# Patient Record
Sex: FEMALE | Race: WHITE | NOT HISPANIC OR LATINO | Employment: OTHER | URBAN - METROPOLITAN AREA
[De-identification: names, ages, dates, MRNs, and addresses within clinical notes are randomized per-mention and may not be internally consistent; named-entity substitution may affect disease eponyms.]

---

## 2023-05-09 RX ORDER — KETOCONAZOLE 20 MG/G
CREAM TOPICAL 2 TIMES DAILY PRN
COMMUNITY

## 2023-05-09 RX ORDER — ACETAMINOPHEN 500 MG
1000 TABLET ORAL EVERY 4 HOURS PRN
COMMUNITY

## 2023-05-09 RX ORDER — ATORVASTATIN CALCIUM 20 MG/1
20 TABLET, FILM COATED ORAL EVERY EVENING
COMMUNITY

## 2023-05-09 RX ORDER — AMOXICILLIN 250 MG
1 CAPSULE ORAL DAILY PRN
COMMUNITY

## 2023-05-09 RX ORDER — HYDROCODONE BITARTRATE AND ACETAMINOPHEN 5; 325 MG/1; MG/1
1 TABLET ORAL EVERY 6 HOURS PRN
COMMUNITY

## 2023-05-09 RX ORDER — ESTRADIOL 0.1 MG/G
CREAM VAGINAL
COMMUNITY

## 2023-05-09 RX ORDER — ONDANSETRON 4 MG/1
4 TABLET, ORALLY DISINTEGRATING ORAL EVERY 8 HOURS PRN
COMMUNITY

## 2023-05-09 RX ORDER — LOSARTAN POTASSIUM 50 MG/1
50 TABLET ORAL EVERY EVENING
COMMUNITY

## 2023-05-09 RX ORDER — CALCIUM CARBONATE-CHOLECALCIFEROL TAB 250 MG-125 UNIT 250-125 MG-UNIT
TAB ORAL
Status: ON HOLD | COMMUNITY
End: 2023-05-11

## 2023-05-09 RX ORDER — BUTALBITAL/ASPIRIN/CAFFEINE 50-325-40
1 CAPSULE ORAL EVERY 4 HOURS PRN
COMMUNITY

## 2023-05-09 RX ORDER — FAMOTIDINE 20 MG/1
20 TABLET, FILM COATED ORAL EVERY EVENING
COMMUNITY

## 2023-05-09 RX ORDER — OXYBUTYNIN CHLORIDE 5 MG/1
5 TABLET ORAL PRN
Status: ON HOLD | COMMUNITY
End: 2023-05-11

## 2023-05-09 RX ORDER — FLUTICASONE PROPIONATE 50 MCG
2 SPRAY, SUSPENSION (ML) NASAL DAILY PRN
COMMUNITY

## 2023-05-09 RX ORDER — LEVOTHYROXINE SODIUM 112 UG/1
112 TABLET ORAL DAILY
COMMUNITY

## 2023-05-10 ENCOUNTER — HOSPITAL ENCOUNTER (INPATIENT)
Facility: HOSPITAL | Age: 68
LOS: 1 days | Discharge: HOME | DRG: 655 | End: 2023-05-11
Attending: OBSTETRICS & GYNECOLOGY | Admitting: OBSTETRICS & GYNECOLOGY
Payer: MEDICARE

## 2023-05-10 ENCOUNTER — ANESTHESIA EVENT (OUTPATIENT)
Dept: SURGERY | Facility: HOSPITAL | Age: 68
DRG: 655 | End: 2023-05-10
Payer: MEDICARE

## 2023-05-10 ENCOUNTER — ANESTHESIA (OUTPATIENT)
Dept: SURGERY | Facility: HOSPITAL | Age: 68
DRG: 655 | End: 2023-05-10
Payer: MEDICARE

## 2023-05-10 DIAGNOSIS — N81.4 UTEROVAGINAL PROLAPSE: Primary | ICD-10-CM

## 2023-05-10 LAB
ABO/RH(D): NORMAL
ANTIBODY SCREEN: NEGATIVE
BASOPHILS # BLD AUTO: 0.1 10E3/UL (ref 0–0.2)
BASOPHILS NFR BLD AUTO: 1 %
EOSINOPHIL # BLD AUTO: 0.2 10E3/UL (ref 0–0.7)
EOSINOPHIL NFR BLD AUTO: 3 %
ERYTHROCYTE [DISTWIDTH] IN BLOOD BY AUTOMATED COUNT: 14.4 % (ref 10–15)
HCT VFR BLD AUTO: 34.7 % (ref 35–47)
HGB BLD-MCNC: 11.1 G/DL (ref 11.7–15.7)
HOLD SPECIMEN: NORMAL
HOLD SPECIMEN: NORMAL
IMM GRANULOCYTES # BLD: 0 10E3/UL
IMM GRANULOCYTES NFR BLD: 0 %
LYMPHOCYTES # BLD AUTO: 2.2 10E3/UL (ref 0.8–5.3)
LYMPHOCYTES NFR BLD AUTO: 26 %
MCH RBC QN AUTO: 26.9 PG (ref 26.5–33)
MCHC RBC AUTO-ENTMCNC: 32 G/DL (ref 31.5–36.5)
MCV RBC AUTO: 84 FL (ref 78–100)
MONOCYTES # BLD AUTO: 0.5 10E3/UL (ref 0–1.3)
MONOCYTES NFR BLD AUTO: 6 %
NEUTROPHILS # BLD AUTO: 5.3 10E3/UL (ref 1.6–8.3)
NEUTROPHILS NFR BLD AUTO: 64 %
NRBC # BLD AUTO: 0 10E3/UL
NRBC BLD AUTO-RTO: 0 /100
PLATELET # BLD AUTO: 311 10E3/UL (ref 150–450)
RBC # BLD AUTO: 4.12 10E6/UL (ref 3.8–5.2)
SPECIMEN EXPIRATION DATE: NORMAL
WBC # BLD AUTO: 8.3 10E3/UL (ref 4–11)

## 2023-05-10 PROCEDURE — 86901 BLOOD TYPING SEROLOGIC RH(D): CPT | Performed by: PHYSICIAN ASSISTANT

## 2023-05-10 PROCEDURE — 86850 RBC ANTIBODY SCREEN: CPT | Performed by: PHYSICIAN ASSISTANT

## 2023-05-10 PROCEDURE — 0USG4ZZ REPOSITION VAGINA, PERCUTANEOUS ENDOSCOPIC APPROACH: ICD-10-PCS | Performed by: OBSTETRICS & GYNECOLOGY

## 2023-05-10 PROCEDURE — 999N000141 HC STATISTIC PRE-PROCEDURE NURSING ASSESSMENT: Performed by: OBSTETRICS & GYNECOLOGY

## 2023-05-10 PROCEDURE — 360N000080 HC SURGERY LEVEL 7, PER MIN: Performed by: OBSTETRICS & GYNECOLOGY

## 2023-05-10 PROCEDURE — 250N000011 HC RX IP 250 OP 636

## 2023-05-10 PROCEDURE — 258N000003 HC RX IP 258 OP 636: Performed by: NURSE ANESTHETIST, CERTIFIED REGISTERED

## 2023-05-10 PROCEDURE — 0TJB8ZZ INSPECTION OF BLADDER, VIA NATURAL OR ARTIFICIAL OPENING ENDOSCOPIC: ICD-10-PCS | Performed by: OBSTETRICS & GYNECOLOGY

## 2023-05-10 PROCEDURE — 250N000011 HC RX IP 250 OP 636: Performed by: OBSTETRICS & GYNECOLOGY

## 2023-05-10 PROCEDURE — 250N000013 HC RX MED GY IP 250 OP 250 PS 637: Performed by: PHYSICIAN ASSISTANT

## 2023-05-10 PROCEDURE — 250N000011 HC RX IP 250 OP 636: Performed by: PHYSICIAN ASSISTANT

## 2023-05-10 PROCEDURE — 0UT24ZZ RESECTION OF BILATERAL OVARIES, PERCUTANEOUS ENDOSCOPIC APPROACH: ICD-10-PCS | Performed by: OBSTETRICS & GYNECOLOGY

## 2023-05-10 PROCEDURE — 88305 TISSUE EXAM BY PATHOLOGIST: CPT | Mod: 26 | Performed by: PATHOLOGY

## 2023-05-10 PROCEDURE — C1781 MESH (IMPLANTABLE): HCPCS | Performed by: OBSTETRICS & GYNECOLOGY

## 2023-05-10 PROCEDURE — 250N000011 HC RX IP 250 OP 636: Performed by: NURSE ANESTHETIST, CERTIFIED REGISTERED

## 2023-05-10 PROCEDURE — 250N000011 HC RX IP 250 OP 636: Performed by: ANESTHESIOLOGY

## 2023-05-10 PROCEDURE — 370N000017 HC ANESTHESIA TECHNICAL FEE, PER MIN: Performed by: OBSTETRICS & GYNECOLOGY

## 2023-05-10 PROCEDURE — 710N000009 HC RECOVERY PHASE 1, LEVEL 1, PER MIN: Performed by: OBSTETRICS & GYNECOLOGY

## 2023-05-10 PROCEDURE — 710N000012 HC RECOVERY PHASE 2, PER MINUTE: Performed by: OBSTETRICS & GYNECOLOGY

## 2023-05-10 PROCEDURE — 250N000009 HC RX 250

## 2023-05-10 PROCEDURE — 88307 TISSUE EXAM BY PATHOLOGIST: CPT | Mod: TC | Performed by: OBSTETRICS & GYNECOLOGY

## 2023-05-10 PROCEDURE — 85025 COMPLETE CBC W/AUTO DIFF WBC: CPT | Performed by: PHYSICIAN ASSISTANT

## 2023-05-10 PROCEDURE — 250N000025 HC SEVOFLURANE, PER MIN: Performed by: OBSTETRICS & GYNECOLOGY

## 2023-05-10 PROCEDURE — 0UT74ZZ RESECTION OF BILATERAL FALLOPIAN TUBES, PERCUTANEOUS ENDOSCOPIC APPROACH: ICD-10-PCS | Performed by: OBSTETRICS & GYNECOLOGY

## 2023-05-10 PROCEDURE — 250N000013 HC RX MED GY IP 250 OP 250 PS 637: Performed by: ANESTHESIOLOGY

## 2023-05-10 PROCEDURE — 0JUC3JZ SUPPLEMENT OF PELVIC REGION SUBCUTANEOUS TISSUE AND FASCIA WITH SYNTHETIC SUBSTITUTE, PERCUTANEOUS APPROACH: ICD-10-PCS | Performed by: OBSTETRICS & GYNECOLOGY

## 2023-05-10 PROCEDURE — 272N000001 HC OR GENERAL SUPPLY STERILE: Performed by: OBSTETRICS & GYNECOLOGY

## 2023-05-10 PROCEDURE — 36415 COLL VENOUS BLD VENIPUNCTURE: CPT | Performed by: PHYSICIAN ASSISTANT

## 2023-05-10 PROCEDURE — 120N000001 HC R&B MED SURG/OB

## 2023-05-10 PROCEDURE — 250N000009 HC RX 250: Performed by: NURSE ANESTHETIST, CERTIFIED REGISTERED

## 2023-05-10 PROCEDURE — 8E0W4CZ ROBOTIC ASSISTED PROCEDURE OF TRUNK REGION, PERCUTANEOUS ENDOSCOPIC APPROACH: ICD-10-PCS | Performed by: OBSTETRICS & GYNECOLOGY

## 2023-05-10 PROCEDURE — 0UQG4ZZ REPAIR VAGINA, PERCUTANEOUS ENDOSCOPIC APPROACH: ICD-10-PCS | Performed by: OBSTETRICS & GYNECOLOGY

## 2023-05-10 PROCEDURE — 258N000003 HC RX IP 258 OP 636: Performed by: ANESTHESIOLOGY

## 2023-05-10 PROCEDURE — 0UT94ZL RESECTION OF UTERUS, SUPRACERVICAL, PERCUTANEOUS ENDOSCOPIC APPROACH: ICD-10-PCS | Performed by: OBSTETRICS & GYNECOLOGY

## 2023-05-10 PROCEDURE — 250N000013 HC RX MED GY IP 250 OP 250 PS 637: Performed by: OBSTETRICS & GYNECOLOGY

## 2023-05-10 DEVICE — SLING Y MESH RESTORELLE CONTOUR 3X24CM 501520: Type: IMPLANTABLE DEVICE | Site: SACRUM | Status: FUNCTIONAL

## 2023-05-10 RX ORDER — FENTANYL CITRATE 50 UG/ML
50 INJECTION, SOLUTION INTRAMUSCULAR; INTRAVENOUS EVERY 5 MIN PRN
Status: DISCONTINUED | OUTPATIENT
Start: 2023-05-10 | End: 2023-05-10 | Stop reason: HOSPADM

## 2023-05-10 RX ORDER — ONDANSETRON 4 MG/1
4 TABLET, ORALLY DISINTEGRATING ORAL EVERY 30 MIN PRN
Status: DISCONTINUED | OUTPATIENT
Start: 2023-05-10 | End: 2023-05-10 | Stop reason: HOSPADM

## 2023-05-10 RX ORDER — PROPOFOL 10 MG/ML
INJECTION, EMULSION INTRAVENOUS CONTINUOUS PRN
Status: DISCONTINUED | OUTPATIENT
Start: 2023-05-10 | End: 2023-05-10

## 2023-05-10 RX ORDER — DEXAMETHASONE SODIUM PHOSPHATE 10 MG/ML
INJECTION, SOLUTION INTRAMUSCULAR; INTRAVENOUS PRN
Status: DISCONTINUED | OUTPATIENT
Start: 2023-05-10 | End: 2023-05-10

## 2023-05-10 RX ORDER — ACETAMINOPHEN 325 MG/1
975 TABLET ORAL ONCE
Status: COMPLETED | OUTPATIENT
Start: 2023-05-10 | End: 2023-05-10

## 2023-05-10 RX ORDER — FENTANYL CITRATE 50 UG/ML
INJECTION, SOLUTION INTRAMUSCULAR; INTRAVENOUS PRN
Status: DISCONTINUED | OUTPATIENT
Start: 2023-05-10 | End: 2023-05-10

## 2023-05-10 RX ORDER — LIDOCAINE HYDROCHLORIDE 10 MG/ML
INJECTION, SOLUTION INFILTRATION; PERINEURAL PRN
Status: DISCONTINUED | OUTPATIENT
Start: 2023-05-10 | End: 2023-05-10

## 2023-05-10 RX ORDER — CEFAZOLIN SODIUM/WATER 2 G/20 ML
2 SYRINGE (ML) INTRAVENOUS
Status: COMPLETED | OUTPATIENT
Start: 2023-05-10 | End: 2023-05-10

## 2023-05-10 RX ORDER — OXYCODONE HYDROCHLORIDE 5 MG/1
5 TABLET ORAL
Status: DISCONTINUED | OUTPATIENT
Start: 2023-05-10 | End: 2023-05-11 | Stop reason: HOSPADM

## 2023-05-10 RX ORDER — IBUPROFEN 600 MG/1
600 TABLET, FILM COATED ORAL EVERY 6 HOURS PRN
Status: DISCONTINUED | OUTPATIENT
Start: 2023-05-10 | End: 2023-05-11 | Stop reason: HOSPADM

## 2023-05-10 RX ORDER — SODIUM CHLORIDE, SODIUM LACTATE, POTASSIUM CHLORIDE, CALCIUM CHLORIDE 600; 310; 30; 20 MG/100ML; MG/100ML; MG/100ML; MG/100ML
INJECTION, SOLUTION INTRAVENOUS CONTINUOUS
Status: DISCONTINUED | OUTPATIENT
Start: 2023-05-10 | End: 2023-05-10 | Stop reason: HOSPADM

## 2023-05-10 RX ORDER — CEFAZOLIN SODIUM/WATER 2 G/20 ML
2 SYRINGE (ML) INTRAVENOUS SEE ADMIN INSTRUCTIONS
Status: DISCONTINUED | OUTPATIENT
Start: 2023-05-10 | End: 2023-05-10 | Stop reason: HOSPADM

## 2023-05-10 RX ORDER — KETAMINE HYDROCHLORIDE 10 MG/ML
INJECTION INTRAMUSCULAR; INTRAVENOUS PRN
Status: DISCONTINUED | OUTPATIENT
Start: 2023-05-10 | End: 2023-05-10

## 2023-05-10 RX ORDER — ONDANSETRON 2 MG/ML
4 INJECTION INTRAMUSCULAR; INTRAVENOUS EVERY 30 MIN PRN
Status: DISCONTINUED | OUTPATIENT
Start: 2023-05-10 | End: 2023-05-10 | Stop reason: HOSPADM

## 2023-05-10 RX ORDER — PROPOFOL 10 MG/ML
INJECTION, EMULSION INTRAVENOUS PRN
Status: DISCONTINUED | OUTPATIENT
Start: 2023-05-10 | End: 2023-05-10

## 2023-05-10 RX ORDER — LIDOCAINE 40 MG/G
CREAM TOPICAL
Status: DISCONTINUED | OUTPATIENT
Start: 2023-05-10 | End: 2023-05-10 | Stop reason: HOSPADM

## 2023-05-10 RX ORDER — HYDROMORPHONE HYDROCHLORIDE 1 MG/ML
0.4 INJECTION, SOLUTION INTRAMUSCULAR; INTRAVENOUS; SUBCUTANEOUS EVERY 5 MIN PRN
Status: DISCONTINUED | OUTPATIENT
Start: 2023-05-10 | End: 2023-05-10 | Stop reason: HOSPADM

## 2023-05-10 RX ORDER — ONDANSETRON 2 MG/ML
INJECTION INTRAMUSCULAR; INTRAVENOUS PRN
Status: DISCONTINUED | OUTPATIENT
Start: 2023-05-10 | End: 2023-05-10

## 2023-05-10 RX ORDER — OXYCODONE HYDROCHLORIDE 5 MG/1
10 TABLET ORAL
Status: COMPLETED | OUTPATIENT
Start: 2023-05-10 | End: 2023-05-10

## 2023-05-10 RX ORDER — GLYCOPYRROLATE 0.2 MG/ML
INJECTION, SOLUTION INTRAMUSCULAR; INTRAVENOUS PRN
Status: DISCONTINUED | OUTPATIENT
Start: 2023-05-10 | End: 2023-05-10

## 2023-05-10 RX ORDER — FENTANYL CITRATE 50 UG/ML
25 INJECTION, SOLUTION INTRAMUSCULAR; INTRAVENOUS EVERY 5 MIN PRN
Status: DISCONTINUED | OUTPATIENT
Start: 2023-05-10 | End: 2023-05-10 | Stop reason: HOSPADM

## 2023-05-10 RX ORDER — IBUPROFEN 600 MG/1
600 TABLET, FILM COATED ORAL ONCE
Status: COMPLETED | OUTPATIENT
Start: 2023-05-10 | End: 2023-05-10

## 2023-05-10 RX ORDER — BUPIVACAINE HYDROCHLORIDE 2.5 MG/ML
INJECTION, SOLUTION INFILTRATION; PERINEURAL PRN
Status: DISCONTINUED | OUTPATIENT
Start: 2023-05-10 | End: 2023-05-10 | Stop reason: HOSPADM

## 2023-05-10 RX ORDER — HYDROMORPHONE HYDROCHLORIDE 1 MG/ML
0.2 INJECTION, SOLUTION INTRAMUSCULAR; INTRAVENOUS; SUBCUTANEOUS EVERY 5 MIN PRN
Status: DISCONTINUED | OUTPATIENT
Start: 2023-05-10 | End: 2023-05-10 | Stop reason: HOSPADM

## 2023-05-10 RX ORDER — OXYCODONE HYDROCHLORIDE 5 MG/1
5-10 TABLET ORAL EVERY 4 HOURS PRN
Qty: 12 TABLET | Refills: 0 | Status: SHIPPED | OUTPATIENT
Start: 2023-05-10

## 2023-05-10 RX ORDER — MAGNESIUM SULFATE 4 G/50ML
4 INJECTION INTRAVENOUS ONCE
Status: COMPLETED | OUTPATIENT
Start: 2023-05-10 | End: 2023-05-10

## 2023-05-10 RX ADMIN — ACETAMINOPHEN 975 MG: 325 TABLET ORAL at 12:40

## 2023-05-10 RX ADMIN — PHENYLEPHRINE HYDROCHLORIDE 100 MCG: 10 INJECTION INTRAVENOUS at 13:37

## 2023-05-10 RX ADMIN — ACETAMINOPHEN 975 MG: 325 TABLET ORAL at 23:12

## 2023-05-10 RX ADMIN — IBUPROFEN 600 MG: 600 TABLET, FILM COATED ORAL at 23:12

## 2023-05-10 RX ADMIN — DEXAMETHASONE SODIUM PHOSPHATE 10 MG: 10 INJECTION, SOLUTION INTRAMUSCULAR; INTRAVENOUS at 13:10

## 2023-05-10 RX ADMIN — ROCURONIUM BROMIDE 50 MG: 50 INJECTION, SOLUTION INTRAVENOUS at 13:11

## 2023-05-10 RX ADMIN — LIDOCAINE HYDROCHLORIDE 50 MG: 10 INJECTION, SOLUTION INFILTRATION; PERINEURAL at 13:10

## 2023-05-10 RX ADMIN — KETAMINE HYDROCHLORIDE 40 MG: 10 INJECTION, SOLUTION INTRAMUSCULAR; INTRAVENOUS at 13:10

## 2023-05-10 RX ADMIN — MAGNESIUM SULFATE HEPTAHYDRATE 4 G: 80 INJECTION, SOLUTION INTRAVENOUS at 12:39

## 2023-05-10 RX ADMIN — FENTANYL CITRATE 50 MCG: 50 INJECTION, SOLUTION INTRAMUSCULAR; INTRAVENOUS at 16:18

## 2023-05-10 RX ADMIN — SODIUM CHLORIDE, POTASSIUM CHLORIDE, SODIUM LACTATE AND CALCIUM CHLORIDE: 600; 310; 30; 20 INJECTION, SOLUTION INTRAVENOUS at 12:39

## 2023-05-10 RX ADMIN — SUGAMMADEX 200 MG: 100 INJECTION, SOLUTION INTRAVENOUS at 14:54

## 2023-05-10 RX ADMIN — SODIUM CHLORIDE, POTASSIUM CHLORIDE, SODIUM LACTATE AND CALCIUM CHLORIDE: 600; 310; 30; 20 INJECTION, SOLUTION INTRAVENOUS at 15:30

## 2023-05-10 RX ADMIN — ONDANSETRON 4 MG: 2 INJECTION INTRAMUSCULAR; INTRAVENOUS at 14:49

## 2023-05-10 RX ADMIN — FENTANYL CITRATE 25 MCG: 50 INJECTION, SOLUTION INTRAMUSCULAR; INTRAVENOUS at 15:28

## 2023-05-10 RX ADMIN — PHENYLEPHRINE HYDROCHLORIDE 100 MCG: 10 INJECTION INTRAVENOUS at 13:34

## 2023-05-10 RX ADMIN — MIDAZOLAM 1 MG: 1 INJECTION INTRAMUSCULAR; INTRAVENOUS at 13:02

## 2023-05-10 RX ADMIN — PROPOFOL 150 MG: 10 INJECTION, EMULSION INTRAVENOUS at 13:10

## 2023-05-10 RX ADMIN — FENTANYL CITRATE 25 MCG: 50 INJECTION, SOLUTION INTRAMUSCULAR; INTRAVENOUS at 15:35

## 2023-05-10 RX ADMIN — PROPOFOL 30 MCG/KG/MIN: 10 INJECTION, EMULSION INTRAVENOUS at 13:18

## 2023-05-10 RX ADMIN — IBUPROFEN 600 MG: 200 TABLET, FILM COATED ORAL at 18:17

## 2023-05-10 RX ADMIN — FENTANYL CITRATE 100 MCG: 50 INJECTION, SOLUTION INTRAMUSCULAR; INTRAVENOUS at 13:10

## 2023-05-10 RX ADMIN — ROCURONIUM BROMIDE 10 MG: 50 INJECTION, SOLUTION INTRAVENOUS at 14:20

## 2023-05-10 RX ADMIN — GLYCOPYRROLATE 0.4 MG: 0.2 INJECTION INTRAMUSCULAR; INTRAVENOUS at 13:10

## 2023-05-10 RX ADMIN — OXYCODONE HYDROCHLORIDE 10 MG: 5 TABLET ORAL at 17:15

## 2023-05-10 RX ADMIN — Medication 2 G: at 13:25

## 2023-05-10 RX ADMIN — PHENYLEPHRINE HYDROCHLORIDE 100 MCG: 10 INJECTION INTRAVENOUS at 13:10

## 2023-05-10 ASSESSMENT — ACTIVITIES OF DAILY LIVING (ADL)
ADLS_ACUITY_SCORE: 20

## 2023-05-10 NOTE — ANESTHESIA PREPROCEDURE EVALUATION
Anesthesia Pre-Procedure Evaluation    Patient: Mary Ball   MRN: [id number] : 1955        Procedure : Procedure(s):  ROBOTIC SUPRACERVICAL HYSTERECTOMY WITH BILATERAL SALPINGO OOPHORECTOMY, SACROCOLPOPEXY, PARAVAGINAL REPAIR AND POSTERIOR REPAIR          Past Medical History:   Diagnosis Date     Anemia      Aortic valve regurgitation      Bladder prolapse, female, acquired      Gastroesophageal reflux disease      Hydronephrosis      Iron deficiency      Migraine      PONV (postoperative nausea and vomiting)      Thyroid disease       Past Surgical History:   Procedure Laterality Date     ORTHOPEDIC SURGERY        Allergies   Allergen Reactions     Adhesive Tape Rash     Steri strips     Ampicillin Rash     Morphine Rash      Social History     Tobacco Use     Smoking status: Never     Smokeless tobacco: Never   Vaping Use     Vaping status: Not on file   Substance Use Topics     Alcohol use: Yes     Comment: 1 glass of wine per month      Wt Readings from Last 1 Encounters:   05/10/23 74.4 kg (164 lb)        Anesthesia Evaluation   Pt has had prior anesthetic. Type: General.    History of anesthetic complications  - PONV.      ROS/MED HX  ENT/Pulmonary:  - neg pulmonary ROS     Neurologic:  - neg neurologic ROS     Cardiovascular:  - neg cardiovascular ROS     METS/Exercise Tolerance: >4 METS    Hematologic:  - neg hematologic  ROS     Musculoskeletal:  - neg musculoskeletal ROS     GI/Hepatic:     (+) GERD, Asymptomatic on medication,     Renal/Genitourinary:     (+) renal disease,     Endo:     (+) thyroid problem, hypothyroidism,     Psychiatric/Substance Use:  - neg psychiatric ROS     Infectious Disease:  - neg infectious disease ROS     Malignancy:  - neg malignancy ROS     Other:  - neg other ROS          Physical Exam    Airway  airway exam normal      Mallampati: II   TM distance: > 3 FB   Neck ROM: full   Mouth opening: > 3 cm    Respiratory Devices and Support         Dental            Cardiovascular   cardiovascular exam normal          Pulmonary   pulmonary exam normal                OUTSIDE LABS:  CBC:   Lab Results   Component Value Date    WBC 8.3 05/10/2023    HGB 11.1 (L) 05/10/2023    HCT 34.7 (L) 05/10/2023     05/10/2023     BMP: No results found for: NA, POTASSIUM, CHLORIDE, CO2, BUN, CR, GLC  COAGS: No results found for: PTT, INR, FIBR  POC: No results found for: BGM, HCG, HCGS  HEPATIC: No results found for: ALBUMIN, PROTTOTAL, ALT, AST, GGT, ALKPHOS, BILITOTAL, BILIDIRECT, DEE  OTHER: No results found for: PH, LACT, A1C, JANE, PHOS, MAG, LIPASE, AMYLASE, TSH, T4, T3, CRP, SED    Anesthesia Plan    ASA Status:  2   NPO Status:  NPO Appropriate    Anesthesia Type: General.     - Airway: ETT   Induction: Intravenous, Propofol.   Maintenance: Balanced.        Consents    Anesthesia Plan(s) and associated risks, benefits, and realistic alternatives discussed. Questions answered and patient/representative(s) expressed understanding.    - Discussed:     - Discussed with:  Patient      - Extended Intubation/Ventilatory Support Discussed: No.      - Patient is DNR/DNI Status: No    Use of blood products discussed: No .     Postoperative Care    Pain management: IV analgesics, Multi-modal analgesia.   PONV prophylaxis: Ondansetron (or other 5HT-3), Dexamethasone or Solumedrol, Droperidol or Haldol, Background Propofol Infusion     Comments:    Other Comments: 4 grams magnesium IV.  40 mg ketamine IV on induction.\            Ernesto Shepherd MD

## 2023-05-10 NOTE — ANESTHESIA PROCEDURE NOTES
Airway       Patient location during procedure: OR       Procedure Start/Stop Times: 5/10/2023 1:20 PM  Staff -        Anesthesiologist:  Ernesto Shepherd MD       CRNA: Veena Ma APRN CRNA       Other Anesthesia Staff: Brandt Jonas       Performed By: MEL  Consent for Airway        Urgency: elective  Indications and Patient Condition       Indications for airway management: scott-procedural and airway protection       Induction type:intravenous       Mask difficulty assessment: 1 - vent by mask    Final Airway Details       Final airway type: endotracheal airway       Successful airway: ETT - single  Endotracheal Airway Details        ETT size (mm): 7.0       Cuffed: yes       Successful intubation technique: video laryngoscopy       VL Blade Size: Glidescope 3       Grade View of Cords: 1       Adjucts: stylet       Position: Center       Measured from: gums/teeth       Secured at (cm): 22       Bite block used: None    Post intubation assessment        Placement verified by: capnometry, equal breath sounds and chest rise        Number of attempts at approach: 3 (MLE attempted DL, MD (Joao) attempted DL, moved to glidescope)       Number of other approaches attempted: 1       Secured with: silk tape       Ease of procedure: easy       Dentition: Intact and Unchanged       Dental guard used and removed. Dental Guard Type: Proguard Red.    Medication(s) Administered   Medication Administration Time: 5/10/2023 1:20 PM    Additional Comments       Vocal cords open and clear, atraumatic

## 2023-05-10 NOTE — H&P
History and Physical Update    I have examined the patient and reviewed the history and physical that is present on this chart. The changes in the patient's history and physical condition are as follows:    None    Solomon Donis MD

## 2023-05-10 NOTE — ANESTHESIA CARE TRANSFER NOTE
Patient: Mary Ball    Procedure: Procedure(s):  ROBOTIC SUPRACERVICAL HYSTERECTOMY WITH BILATERAL SALPINGO OOPHORECTOMY, SACROCOLPOPEXY, PARAVAGINAL REPAIR       Diagnosis: Cystocele and rectocele with incomplete uterovaginal prolapse [N81.2]  Diagnosis Additional Information: No value filed.    Anesthesia Type:   General     Note:    Oropharynx: oropharynx clear of all foreign objects and spontaneously breathing  Level of Consciousness: awake  Oxygen Supplementation: face mask  Level of Supplemental Oxygen (L/min / FiO2): 6  Independent Airway: airway patency satisfactory and stable  Dentition: dentition unchanged  Vital Signs Stable: post-procedure vital signs reviewed and stable  Report to RN Given: handoff report given  Patient transferred to: PACU  Comments: Pt transferred to PACU. Extubated in OR and spontaneously breathing with sufficient gas exchange. On 6L O2 via FM. No airway. VSS. Normothermic. Report to RN at bedside.   PATRICK Lopez CRNA     Handoff Report: Identifed the Patient, Identified the Reponsible Provider, Reviewed the pertinent medical history, Discussed the surgical course, Reviewed Intra-OP anesthesia mangement and issues during anesthesia, Set expectations for post-procedure period and Allowed opportunity for questions and acknowledgement of understanding      Vitals:  Vitals Value Taken Time   /74 05/10/23 1509   Temp 37.2  C (99  F) 05/10/23 1508   Pulse 68 05/10/23 1511   Resp 30 05/10/23 1511   SpO2 98 % 05/10/23 1511   Vitals shown include unvalidated device data.    Electronically Signed By: PATRICK Mendiola CRNA  May 10, 2023  3:12 PM

## 2023-05-11 VITALS
RESPIRATION RATE: 18 BRPM | SYSTOLIC BLOOD PRESSURE: 165 MMHG | HEIGHT: 62 IN | HEART RATE: 56 BPM | WEIGHT: 164 LBS | TEMPERATURE: 98 F | OXYGEN SATURATION: 96 % | BODY MASS INDEX: 30.18 KG/M2 | DIASTOLIC BLOOD PRESSURE: 74 MMHG

## 2023-05-11 LAB
GLUCOSE BLDC GLUCOMTR-MCNC: 130 MG/DL (ref 70–99)
PATH REPORT.COMMENTS IMP SPEC: NORMAL
PATH REPORT.COMMENTS IMP SPEC: NORMAL
PATH REPORT.FINAL DX SPEC: NORMAL
PATH REPORT.GROSS SPEC: NORMAL
PATH REPORT.MICROSCOPIC SPEC OTHER STN: NORMAL
PATH REPORT.RELEVANT HX SPEC: NORMAL
PHOTO IMAGE: NORMAL

## 2023-05-11 PROCEDURE — 250N000013 HC RX MED GY IP 250 OP 250 PS 637: Performed by: OBSTETRICS & GYNECOLOGY

## 2023-05-11 PROCEDURE — 258N000003 HC RX IP 258 OP 636: Performed by: OBSTETRICS & GYNECOLOGY

## 2023-05-11 RX ORDER — OXYBUTYNIN CHLORIDE 5 MG/1
5 TABLET, EXTENDED RELEASE ORAL EVERY EVENING
COMMUNITY

## 2023-05-11 RX ORDER — LACTOBACILLUS RHAMNOSUS GG 10B CELL
1 CAPSULE ORAL EVERY EVENING
COMMUNITY

## 2023-05-11 RX ORDER — GUAR GUM
1 PACKET (EA) ORAL EVERY EVENING
COMMUNITY

## 2023-05-11 RX ORDER — CLINDAMYCIN HCL 300 MG
600 CAPSULE ORAL DAILY PRN
COMMUNITY

## 2023-05-11 RX ORDER — GUAR GUM
1 PACKET (EA) ORAL DAILY
Status: DISCONTINUED | OUTPATIENT
Start: 2023-05-11 | End: 2023-05-11 | Stop reason: HOSPADM

## 2023-05-11 RX ADMIN — Medication 1 LOZENGE: at 11:48

## 2023-05-11 RX ADMIN — IBUPROFEN 600 MG: 200 TABLET, FILM COATED ORAL at 11:47

## 2023-05-11 RX ADMIN — SODIUM CHLORIDE 500 ML: 9 INJECTION, SOLUTION INTRAVENOUS at 09:12

## 2023-05-11 RX ADMIN — Medication 1 LOZENGE: at 10:44

## 2023-05-11 RX ADMIN — Medication 1 PACKET: at 09:19

## 2023-05-11 ASSESSMENT — ACTIVITIES OF DAILY LIVING (ADL)
ADLS_ACUITY_SCORE: 20
ADLS_ACUITY_SCORE: 22

## 2023-05-11 NOTE — PROGRESS NOTES
ON CALL PHYSICIAN    Call received from [facility].  Patient unable to urinate.  Bladder scan reveals 300+ml of urine.  Patient requests hospitalization overnight.  Will place in observation status.  Plan zarate placement.      Ann Bender, DO

## 2023-05-11 NOTE — PROGRESS NOTES
"PRIMARY DIAGNOSIS: Uterovaginal prolapse  OUTPATIENT/OBSERVATION GOALS TO BE MET BEFORE DISCHARGE:  1. ADLs back to baseline: Yes    2. Activity and level of assistance: Up with standby assistance.    3. Pain status: Improved-controlled with oral pain medications.    4. Return to near baseline physical activity: Yes     Discharge Planner Nurse   Safe discharge environment identified: Yes  Barriers to discharge: Yes       Entered by: Darlene López RN 05/11/2023 2:29 AM     Transferred to unit this evening. AOx4. Pain 2/10 with scheduled tylenol and ibuprofen and warm pack. BP slightly elevated on arrival but went down when later rechecked. Did have 2 emesises that is likely from her \"sour stomach\". Has only had a few crackers and some  Juice/water since surgery. Does feel slightly nauseous only when head of bed not elevated. Mentions that she normally takes an antacid before bed for her Gerd which she did not take here and feels her emesis is related to that. Declines the need for an antiemetic. Is otherwise tolerating thin liquids and full diet. Daughter called and updated. Kim removed this morning, 500ccs out overnight.   "

## 2023-05-11 NOTE — UTILIZATION REVIEW
Admission Status; Secondary Review CONDITIONAL Determination     Under the authority of the Utilization Management Committee, the utilization review process indicated a secondary review on the above patient. The review outcome is based on review of the medical records, discussions with staff, and applying clinical experience noted on the date of the review.     (x) Inpatient Status Appropriate - This patient's medical care is consistent with medical management for inpatient care and reasonable inpatient medical practice.     RATIONALE FOR DETERMINATION     Ms. Ball is a 68 female who presented to the hospital for elective BSO, sacrocolpopexy and paravaginal repair.  Post-op course has been complicated by nausea and emesis; she has not yet been able to tolerate po post-op.  She has not yet voided; restarting some IVF this am.     If she clinically improves and is able to discharge home later this am then would change to OUTPATIENT before discharge.       At the time of admission with the information available to the attending physician more than 2 nights Hospital complex care was anticipated, based on patient risk of adverse outcome if treated as outpatient and complex care required. Inpatient admission is appropriate based on the Medicare guidelines.     The information on this document is developed by the utilization review team in order for the business office to ensure compliance. This only denotes the appropriateness of proper admission status and does not reflect the quality of care rendered.   The definitions of Inpatient Status and Observation Status used in making the determination above are those provided in the CMS Coverage Manual, Chapter 1 and Chapter 6, section 70.4.         Sincerely,     Kristen Simpson, DO  Utilization Review  Physician Advisor  [facility].

## 2023-05-11 NOTE — PLAN OF CARE
Goal Outcome Evaluation:  Dr. Husain updated on pts PVR's and BP.  Script for Oxycodone given to pts spouse yesterday.

## 2023-05-11 NOTE — PROGRESS NOTES
SPIRITUAL HEALTH SERVICES NOTE  [facility]; P1    SPIRITUAL ASSESSMENT    In good spirits; hoping to discharge today    Reports minimal pain    Good support from family    Yazidi; her Taoism is aware of her surgery    FULL SPIRITUAL CARE NOTE:   Saw Mary due to admission screening response/patient request upon admission. She shares that she had a robotic-assisted hysterectomy yesterday and a pelvic floor repair. She is pleased with how little pain she has experienced post-surgery, but has been surprised that she has not been able to urinate. She was planning to go this morning and is hopeful that she will still be discharged this afternoon.     Mary identifies support from her  Ravi, her 4 daughters, and many friends. She says that she is good about listening to her body and has no problem asking for help. She identifies her 20 month old granddaughter as a source of kinsey.  Mary is Yazidi and her Taoism is aware of her admission. She was anointed by our hospital  earlier today. No other concerns noted at this time.     Time Spent with Patient/Family: 15 minutes    PLAN OF CARE: No plans for follow-up at this time due to patient's anticipated discharge today/tomorrow.  available by patient or staff request.     Ginger Calvert M.Div.      Office: [phone] (for non-urgent requests)  Please Vocera or page through [facility] for time-sensitive requests

## 2023-05-11 NOTE — PLAN OF CARE
Goal Outcome Evaluation:  Patient c/o surgical abdominal pain after ambulating rated 2/10. PRN Ibuprofen given and effective. Currently denies pain. Upgraded to regular diet and tolerated it. Denies nausea. Ambulated about 150 ft X 2. Voiding but not completely emptying bladder. PVR ; 183, 228 and 215 this shift. Dr. Husain OBGYN, aware and would want PVR's to be less than 200 ml prior to discharging patient. Will continue to monitor.

## 2023-05-11 NOTE — OR NURSING
Patient is unable to void.Scanned bladder and showed 337 ml. Left message to the surgeons cellphone number and beeper sent also. After 30 minutes I also paged the on call doctor and awaiting for call back.

## 2023-05-11 NOTE — PHARMACY-ADMISSION MEDICATION HISTORY
Pharmacist Admission Medication History    Admission medication history is complete. The information provided in this note is only as accurate as the sources available at the time of the update.    Medication reconciliation/reorder completed by provider prior to medication history? No    Information Source(s): Patient, Clinic records and CareEverywhere/SureScripts via in-person    Pertinent Information: takes most meds in the the evening, holding estrace vag. Cream lately- was going to follow with MD, regarding it.    Changes made to PTA medication list:    Added: probiotic, benefiber, OTC joint pill,    Deleted: duplicate fish oil     Changed: calcium/vit d, famoitidine bid to qpm, flonaase nasal to prn, ketoconazole cream to prn, levothyroxine 100mcg to 112mcg(in march 2023). Ditropan ER prn to qpm,     Medication Affordability:       Allergies reviewed with patient and updates made in EHR: yes    Medication History Completed By: Terell Edwards [facility] 5/11/2023 8:41 AM    Prior to Admission medications    Medication Sig Last Dose Taking? Auth Provider Long Term End Date   acetaminophen (TYLENOL) 500 MG tablet Take 1,000 mg by mouth every 4 hours as needed for mild pain 5/9/2023 at am Yes Reported, Patient     atorvastatin (LIPITOR) 20 MG tablet Take 20 mg by mouth every evening 5/9/2023 at pm Yes Reported, Patient Yes    butalbital-aspirin-caffeine (FIORINAL) -40 MG per capsule Take 1 capsule by mouth every 4 hours as needed for headaches Past Week Yes Reported, Patient     Calcium Citrate-Vitamin D 500-10 MG-MCG CHEW Take 1 chew tab by mouth every evening Takes calcium/vit d/zinc gummy(not sure of strengths at this time) 5/9/2023 at pm Yes Unknown, Entered By History     clindamycin (CLEOCIN) 300 MG capsule Take 600 mg by mouth daily as needed Prior to dental appointments last week Yes Unknown, Entered By History     estradiol (ESTRACE) 0.1 MG/GM vaginal cream Place vaginally twice a week More than a month  Yes Reported, Patient     famotidine (PEPCID) 20 MG tablet Take 20 mg by mouth every evening 5/9/2023 at pm Yes Reported, Patient     fluticasone (FLONASE) 50 MCG/ACT nasal spray Spray 2 sprays into both nostrils daily as needed for rhinitis Past Week Yes Reported, Patient     Guar Gum (FIBER MODULAR, NUTRISOURCE FIBER,) packet Take 1 packet by mouth every evening 5/9/2023 at pm Yes Unknown, Entered By History     HYDROcodone-acetaminophen (NORCO) 5-325 MG tablet Take 1 tablet by mouth every 6 hours as needed for severe pain not recently Yes Reported, Patient     ketoconazole (NIZORAL) 2 % external cream Apply topically 2 times daily as needed 3 weeks ago Yes Reported, Patient     lactobacillus rhamnosus, GG, (CULTURELL) capsule Take 1 capsule by mouth every evening 5/9/2023 at pm Yes Unknown, Entered By History     levothyroxine (SYNTHROID/LEVOTHROID) 112 MCG tablet Take 112 mcg by mouth daily 5/10/2023 at 0700 Yes Reported, Patient Yes    losartan (COZAAR) 50 MG tablet Take 50 mg by mouth every evening 5/9/2023 at pm Yes Reported, Patient Yes    OMEGA-3 FATTY ACIDS PO Take 1 g by mouth daily week ago Yes Reported, Patient     ondansetron (ZOFRAN ODT) 4 MG ODT tab Take 4 mg by mouth every 8 hours as needed for nausea not recently Yes Reported, Patient     oxybutynin ER (DITROPAN XL) 5 MG 24 hr tablet Take 5 mg by mouth every evening 5/9/2023 at pm Yes Unknown, Entered By History     oxyCODONE (ROXICODONE) 5 MG tablet Take 1-2 tablets (5-10 mg) by mouth every 4 hours as needed for moderate to severe pain  Yes Solomon Donis MD     senna-docusate (SENOKOT-S/PERICOLACE) 8.6-50 MG tablet Take 1 tablet by mouth daily as needed for constipation not recently Yes Reported, Patient     UNABLE TO FIND Take 1 capsule by mouth every evening MEDICATION NAME: takes an OTC for joint pain- unsure of name at this time. 5/9/2023 at pm Yes Unknown, Entered By History

## 2023-05-11 NOTE — PROGRESS NOTES
"GYN Post op    S: Patient vomited last night, feeling a little better but sore throat this morning and thirsty but scared to vomit. Needs to urinate still.    O:  /58 (BP Location: Left arm)   Pulse 64   Temp 98.2  F (36.8  C) (Oral)   Resp 20   Ht 1.575 m (5' 2\")   Wt 74.4 kg (164 lb)   SpO2 96%   BMI 30.00 kg/m    Gen: Incisions covered by bandages, cental incision with dried blood.    A/P:  Mary Ball is a 68 year old POD#1 s/p RA BREANA, BSO, sacrocolpopexy and paravaginal repair.   -Await spontaneous void  -500 mL bolus per patient request given thirsty, needs to urinate but not routinely drinking.  -Nausea needs to improve  -Benefiber from home meds added.  -Once meeting goals can discharge.    Joanne Husain MD, FACOG, [facility]  5/11/2023 8:34 AM    "

## 2023-05-11 NOTE — DISCHARGE SUMMARY
[facility] Discharge Summary    Mary Ball MRN# [id number]   Age: 68 year old YOB: 1955     Date of Admission:  5/10/2023  Date of Discharge::  5/11/2023   Admitting Physician:  5/11/2023  Discharge Physician:  Jennie Husain MD     Home clinic: [facility]          Admission Diagnoses:   Uterovaginal prolapse, cystocele          Discharge Diagnosis:   Same          Procedures:   Procedure(s):  Robot assisted laparoscopic supracervical hysterectomy, bilateral salpingo-oophorectomy, sacrocolpopexy and paravaginal repair, cystoscopy                Medications Prior to Admission:     Medications Prior to Admission   Medication Sig Dispense Refill Last Dose     acetaminophen (TYLENOL) 500 MG tablet Take 1,000 mg by mouth every 4 hours as needed for mild pain   5/9/2023 at am     atorvastatin (LIPITOR) 20 MG tablet Take 20 mg by mouth every evening   5/9/2023 at pm     butalbital-aspirin-caffeine (FIORINAL) -40 MG per capsule Take 1 capsule by mouth every 4 hours as needed for headaches   Past Week     Calcium Citrate-Vitamin D 500-10 MG-MCG CHEW Take 1 chew tab by mouth every evening Takes calcium/vit d/zinc gummy(not sure of strengths at this time)   5/9/2023 at pm     clindamycin (CLEOCIN) 300 MG capsule Take 600 mg by mouth daily as needed Prior to dental appointments   last week     estradiol (ESTRACE) 0.1 MG/GM vaginal cream Place vaginally twice a week   More than a month     famotidine (PEPCID) 20 MG tablet Take 20 mg by mouth every evening   5/9/2023 at pm     fluticasone (FLONASE) 50 MCG/ACT nasal spray Spray 2 sprays into both nostrils daily as needed for rhinitis   Past Week     Guar Gum (FIBER MODULAR, NUTRISOURCE FIBER,) packet Take 1 packet by mouth every evening   5/9/2023 at pm     HYDROcodone-acetaminophen (NORCO) 5-325 MG tablet Take 1 tablet by mouth every 6 hours as needed for severe pain   not recently     ketoconazole (NIZORAL) 2 % external cream  Apply topically 2 times daily as needed   3 weeks ago     lactobacillus rhamnosus, GG, (CULTURELL) capsule Take 1 capsule by mouth every evening   5/9/2023 at pm     levothyroxine (SYNTHROID/LEVOTHROID) 112 MCG tablet Take 112 mcg by mouth daily   5/10/2023 at 0700     losartan (COZAAR) 50 MG tablet Take 50 mg by mouth every evening   5/9/2023 at pm     OMEGA-3 FATTY ACIDS PO Take 1 g by mouth daily   week ago     ondansetron (ZOFRAN ODT) 4 MG ODT tab Take 4 mg by mouth every 8 hours as needed for nausea   not recently     oxybutynin ER (DITROPAN XL) 5 MG 24 hr tablet Take 5 mg by mouth every evening   5/9/2023 at pm     senna-docusate (SENOKOT-S/PERICOLACE) 8.6-50 MG tablet Take 1 tablet by mouth daily as needed for constipation   not recently     UNABLE TO FIND Take 1 capsule by mouth every evening MEDICATION NAME: takes an OTC for joint pain- unsure of name at this time.   5/9/2023 at pm     [DISCONTINUED] Omega-3 Fatty Acids (FISH OIL) 300 MG CAPS Take 300 mg by mouth                Discharge Medications:     Current Discharge Medication List      START taking these medications    Details   oxyCODONE (ROXICODONE) 5 MG tablet Take 1-2 tablets (5-10 mg) by mouth every 4 hours as needed for moderate to severe pain  Qty: 12 tablet, Refills: 0    Associated Diagnoses: Uterovaginal prolapse         CONTINUE these medications which have NOT CHANGED    Details   acetaminophen (TYLENOL) 500 MG tablet Take 1,000 mg by mouth every 4 hours as needed for mild pain      atorvastatin (LIPITOR) 20 MG tablet Take 20 mg by mouth every evening      butalbital-aspirin-caffeine (FIORINAL) -40 MG per capsule Take 1 capsule by mouth every 4 hours as needed for headaches      Calcium Citrate-Vitamin D 500-10 MG-MCG CHEW Take 1 chew tab by mouth every evening Takes calcium/vit d/zinc gummy(not sure of strengths at this time)      clindamycin (CLEOCIN) 300 MG capsule Take 600 mg by mouth daily as needed Prior to dental appointments       estradiol (ESTRACE) 0.1 MG/GM vaginal cream Place vaginally twice a week      famotidine (PEPCID) 20 MG tablet Take 20 mg by mouth every evening      fluticasone (FLONASE) 50 MCG/ACT nasal spray Spray 2 sprays into both nostrils daily as needed for rhinitis      Guar Gum (FIBER MODULAR, NUTRISOURCE FIBER,) packet Take 1 packet by mouth every evening      HYDROcodone-acetaminophen (NORCO) 5-325 MG tablet Take 1 tablet by mouth every 6 hours as needed for severe pain      ketoconazole (NIZORAL) 2 % external cream Apply topically 2 times daily as needed      lactobacillus rhamnosus, GG, (CULTURELL) capsule Take 1 capsule by mouth every evening      levothyroxine (SYNTHROID/LEVOTHROID) 112 MCG tablet Take 112 mcg by mouth daily      losartan (COZAAR) 50 MG tablet Take 50 mg by mouth every evening      OMEGA-3 FATTY ACIDS PO Take 1 g by mouth daily      ondansetron (ZOFRAN ODT) 4 MG ODT tab Take 4 mg by mouth every 8 hours as needed for nausea      oxybutynin ER (DITROPAN XL) 5 MG 24 hr tablet Take 5 mg by mouth every evening      senna-docusate (SENOKOT-S/PERICOLACE) 8.6-50 MG tablet Take 1 tablet by mouth daily as needed for constipation      UNABLE TO FIND Take 1 capsule by mouth every evening MEDICATION NAME: takes an OTC for joint pain- unsure of name at this time.                   Consultations:   No consultations were requested during this admission          Brief History of Illness:   Patient was admitted overnight after surgery for urinary retention.            Hospital Course:   The patient's hospital course was notable for nausea/vomiting after taking clear liquids. She had her zarate removed day 1. She had PVRs in the 200s but did not have symptoms otherwise of retention. Dr. Donis was comfortable with her going home with close follow up.          Discharge Instructions and Follow-Up:   Discharge diet: Regular   Discharge activity: Your activity upon discharge: no lifting >15 lbs or strenuous exercise  for 6 weeks, no driving for 2 weeks or until you can slam on the breaks w/o pain, nothing in the vagina for 6 weeks (no tampons, intercourse, douching).        Discharge follow-up: Follow up in 1-2 weeks for suture removal   Wound care Sutures out in 7 days           Discharge Disposition:   Discharged to home      Attestation:  I have reviewed today's vital signs, notes, medications, labs and imaging.    Jennie Husain MD

## 2023-05-15 NOTE — OP NOTE
Procedure Date: 05/10/2023    PREOPERATIVE DIAGNOSIS:  Uterovaginal prolapse.    POSTOPERATIVE DIAGNOSIS:  Uterovaginal prolapse.    PROCEDURE:  Robotic supracervical hysterectomy with bilateral salpingo-oophorectomy, sacrocolpopexy, and bilateral paravaginal defect repair.    SURGEON:  Solomon Donis MD    ASSISTANT:  Laura Whalen.    ANESTHESIA:  General.    ESTIMATED BLOOD LOSS:  10 mL, replaced with lactated Ringer's.    DRAINS:  None.    COMPLICATIONS:  None.    HPI:  This is a 68-year-old female with symptomatic uterovaginal prolapse.  She was given informed consent for the above.  The risks, benefits and alternatives were discussed.  She expressed understanding and wished to proceed.    OPERATIVE FINDINGS:  The uterus, tubes, and ovaries all appeared normal.  Upper abdomen was normal.  There was significant apical defect and a large cystocele present.  No significant posterior defect was noted after repair of the above.    PROCEDURE NOTE:  The patient was brought to the operating room and after induction of general anesthesia, was prepped and draped in the dorsal lithotomy position.  A timeout was called and the patient and the procedure were verified.  A ALEXA catheter was attached to the cervix and a Alvarez was placed.  Attention was then directed to the abdomen where a supraumbilical incision was made.  A Veress needle was inserted and the abdomen was insufflated with 3 liters of CO2.  An 8 mm trocar and trocar sheath was inserted and a laparoscope was introduced.  Two additional incisions were then made to the right and to the left.  The appropriate robotic trocars were placed.  The patient was placed in steep Trendelenburg and the robot was docked.  I then took my place at the console.  Both ureters were identified transperitoneally.  The vessel sealer was introduced and used to come across each infundibulopelvic, broad and round ligaments, and then a series of parametrial bites were obtained.  Bladder  flap was taken down in the usual manner and the same instrument was used to come across the uterocervical junction.  The cervix was then oversewn using 0 Vicryl and the specimen was placed in the left upper quadrant.  The peritoneum over the anterior and posterior aspects of the cervix were then taken down.  Attention was then directed to the sacral promontory, where the sigmoid colon was retracted medially, the peritoneum over the sacral promontory was opened and the anterior longitudinal ligament exposed.  A Coloplast Y-shaped mesh was then introduced and then fixed anteriorly and posteriorly using a V-Loc suture.  [place] forceps was then used to tunnel underneath the peritoneum.  The long arm of the mesh was brought up to the sacral promontory into the anterior longitudinal ligament where it was fixed using 3 interrupted Smithdale-Kennedy sutures.  It was then appropriately trimmed and the peritoneum was closed in its entirety.  The bladder was then filled with saline.  The space of Retzius was opened.  The adventitia was taken down and the bilateral paravaginal defect exposed.  A V-Loc was then used to reapproximate the vaginal fascia to the arcus tendineus, both the right and left sides, with excellent results.  Copious amounts of irrigation were then used.  Good hemostasis was noted.  The area was reperitonealized.  The robot was undocked.  A morcellator was introduced and the specimen was removed in multiple passes and submitted for pathologic exam.  Good hemostasis was noted.  Skin was closed with nylon.  Sponge and instrument counts were correct.  The patient tolerated the procedure well and was taken to the recovery room in good condition.      Solomon Donis MD   Female Pelvic Medicine and Reconstructive Surgery          D: 05/15/2023   T: 05/15/2023   MT: bruna    Name:     FRANCISCO HERNADEZ  MRN:      [id number]        Account:        [id number]   :      1955           Procedure Date: 05/10/2023      Document: [id number]

## (undated) DEVICE — SUTURE GORTEX 2N02A

## (undated) DEVICE — DEVICE CLOSURE V-LOC 2-0 V-20 TAPER POINT 6IN VLOCN0605

## (undated) DEVICE — ENDO OBTURATOR ACCESS PORT BLADELESS 8X100MM IAS8-100LP

## (undated) DEVICE — SUTURE VICRYL+ 0 36IN CT-1 UND VCP946H

## (undated) DEVICE — DAVINCI XI HANDPIECE ESU VESSEL SEALER 8MM EXT 480422

## (undated) DEVICE — PREP POVIDONE-IODINE 10% SOLUTION 4OZ BOTTLE MDS093944

## (undated) DEVICE — GLOVE SURG PI ULTRA TOUCH M SZ 7 LF 42670

## (undated) DEVICE — PREP CHLORAPREP 26ML TINTED HI-LITE ORANGE 930815

## (undated) DEVICE — DRESSING COVERLET STRIP 3/4 X 3 LF 230

## (undated) DEVICE — BLADE KNIFE SURG 11 371111

## (undated) DEVICE — ENDO SHEARS RENEW LAP ENDOCUT SCISSOR TIP 16.5MM 3142

## (undated) DEVICE — TUBING FILTER TRI-LUMEN AIRSEAL ASC-EVAC1

## (undated) DEVICE — CUSTOM PACK DA VINCI GYN SMA5BDVHEA

## (undated) DEVICE — CATH FOLEY 16FR 5ML LUBRICATH LATEX 0165L16

## (undated) DEVICE — DAVINCI HOT SHEARS TIP COVER  400180

## (undated) DEVICE — DRAPE BACK TABLE  44X90" 8377

## (undated) DEVICE — SURGICEL POWDER ABSORBABLE HEMOSTAT 3GM 3013SP

## (undated) DEVICE — LUBRICANT INST ELECTROLUBE EL101

## (undated) DEVICE — SUCTION MANIFOLD NEPTUNE 2 SYS 1 PORT 702-025-000

## (undated) DEVICE — APPLICATOR ENDOSCOPIC 5 SURGICEL POWDER 3123SPEA

## (undated) DEVICE — GLOVE SURG PI ULTRA TOUCH M SZ 7-1/2 LF

## (undated) DEVICE — MORCELLATOR XCISE SINGLE USE

## (undated) DEVICE — TUBING LAP SUCT/IRRIG STRYKER 250070500

## (undated) DEVICE — NEEDLE HYPO MAGELLAN SAFETY 22GA 1 1/2IN 8881850215

## (undated) DEVICE — SYR 50ML CATH TIP W/O NDL 309620

## (undated) DEVICE — ADAPTER DRAPE ALLY AU-AD

## (undated) DEVICE — SYR 10ML LL W/O NDL 302995

## (undated) DEVICE — Device

## (undated) DEVICE — SYR 50ML SLIP TIP W/O NDL 309654

## (undated) DEVICE — SYSTEM LAPAROVUE VISIBILITY LAPVUE10

## (undated) DEVICE — DECANTER VIAL 2006S

## (undated) DEVICE — GOWN XXL 9575

## (undated) DEVICE — PAD POS XL 1X20X40IN PINK PIGAZZI

## (undated) DEVICE — GLOVE SURG PI ULTRA TOUCH M SZ 6-1/2 LF

## (undated) DEVICE — GLOVE UNDER INDICATOR PI SZ 7.0 LF 41670

## (undated) DEVICE — MAT FLOOR SURGICAL 40X38 0702140238

## (undated) DEVICE — DAVINCI XI OBTURATOR BLADELESS 8MM 470359

## (undated) DEVICE — BRIEF STRETCH XL MPS40

## (undated) DEVICE — NDL INSUFFLATION 13GA 120MM C2201

## (undated) DEVICE — PREP POVIDONE-IODINE 7.5% SCRUB 4OZ BOTTLE MDS093945

## (undated) DEVICE — SU WND CLOSURE V-LOC 90 SZ 2-0 9" GS-22 VLOCM2145

## (undated) DEVICE — SUTURE VICRYL+ 0 27IN CT-1 UND VCP260H

## (undated) DEVICE — SU WND CLOSURE VLOC 180 ABS 0 9" GS-21 VLOCL0346

## (undated) DEVICE — PROTECTOR ARM STANDARD ONE STEP

## (undated) DEVICE — SU ETHILON 4-0 PS-2 18" BLACK 1667H

## (undated) DEVICE — DRAPE U SPLIT 74X120" 29440

## (undated) DEVICE — DAVINCI XI SEAL UNIVERSAL 5-8MM 470361

## (undated) DEVICE — DAVINCI XI DRAPE ARM 470015

## (undated) RX ORDER — DEXAMETHASONE SODIUM PHOSPHATE 10 MG/ML
INJECTION, SOLUTION INTRAMUSCULAR; INTRAVENOUS
Status: DISPENSED
Start: 2023-05-10

## (undated) RX ORDER — LIDOCAINE HYDROCHLORIDE AND EPINEPHRINE 10; 10 MG/ML; UG/ML
INJECTION, SOLUTION INFILTRATION; PERINEURAL
Status: DISPENSED
Start: 2023-05-10

## (undated) RX ORDER — PROPOFOL 10 MG/ML
INJECTION, EMULSION INTRAVENOUS
Status: DISPENSED
Start: 2023-05-10

## (undated) RX ORDER — BUPIVACAINE HYDROCHLORIDE 2.5 MG/ML
INJECTION, SOLUTION EPIDURAL; INFILTRATION; INTRACAUDAL
Status: DISPENSED
Start: 2023-05-10

## (undated) RX ORDER — ONDANSETRON 2 MG/ML
INJECTION INTRAMUSCULAR; INTRAVENOUS
Status: DISPENSED
Start: 2023-05-10

## (undated) RX ORDER — GINSENG 100 MG
CAPSULE ORAL
Status: DISPENSED
Start: 2023-05-10

## (undated) RX ORDER — FENTANYL CITRATE 50 UG/ML
INJECTION, SOLUTION INTRAMUSCULAR; INTRAVENOUS
Status: DISPENSED
Start: 2023-05-10